# Patient Record
(demographics unavailable — no encounter records)

---

## 2025-02-14 NOTE — DISCUSSION/SUMMARY
[de-identified] : Left thumb pain  HPI Patient is a 15-year-old female accompanied by her mother who report to the office for evaluation of her left thumb pain since earlier today, 02/14/2025. She states that she accidentally slipped on ice and fell landing on her left thumb. Since then, certain rom and palpating certain areas of the thumb aggravate the pain. She is right hand dominant. Admits to mild bruising and swelling of that thumb area. Denies any numbness or tingling.   Left thumb x-ray taken in office today revealed a questionable fracture noted at the base of the first metacarpal. No dislocation. Otherwise, no other significant abnormalities were seen.  Left hand exam is as follow: Mild swelling and ecchymosis noted at base of left thumb. No erythema. TPP over base of first metacarpal and thenar eminence. Stiffness with first CMC joint flexion/extension with pain. grasp strength 4/5 with discomfort. No instability at thumb MCP joint. light tough intact. good capillary refill of all fingers.   Assessment/plan Explained questionable fracture on x-ray. She was provided with a thumb spica brace for support/stability. Explained that she should keep this on at all times including during sleep. She may take it on/off for hygiene purposes only. She understands and will comply.  OTC NSAIDs PRN for pain. Rest/ice area. Left thumb MRI ordered for further evaluation. Follow up in 2 weeks. All questions/concerns were answered in detail

## 2025-02-28 NOTE — ASSESSMENT
[FreeTextEntry1] :  The patient is being seen today for a left thumb injury. She hurt the thumb ice skating after falling. She fell backwards onto her hand and her thumb did not bend back at all. Her pain is improved since the initial injury. She is accompanied by her mother.  She says she is been wearing the brace nearly at all times only takes it off if it is irritating her knee 20 minutes a day.  She does not have other complaints besides some mild pain in the palm.  left thumb: No gross deformities visualized, mildly tender to palpation over the base of the thumb and mid palmar area, nontender to palpation over the snuffbox, nontender ovation anywhere along the MP joint, no laxity with stressing both in full extension and 30 degrees of flexion, full range of motion of the thumb, neurovascular intact  MRI left thumb results shows sprain of UCL and RCL  We discussed the anatomy, pathology and diagnosis of a contusion at length.  All questions were answered and the patient agrees to the treatment plan. We discussed that she does not need to wear the thumb spica at all times and only to wear it in uncontrolled situations, i.e. at school or while sleeping. We discussed that she may start activities as tolerated. She will follow up in 2 weeks for reevaluation if she is not improving.